# Patient Record
Sex: MALE | Race: WHITE | NOT HISPANIC OR LATINO | Employment: FULL TIME | URBAN - NONMETROPOLITAN AREA
[De-identification: names, ages, dates, MRNs, and addresses within clinical notes are randomized per-mention and may not be internally consistent; named-entity substitution may affect disease eponyms.]

---

## 2025-06-22 ENCOUNTER — OFFICE VISIT (OUTPATIENT)
Dept: URGENT CARE | Facility: URGENT CARE | Age: 65
End: 2025-06-22
Payer: COMMERCIAL

## 2025-06-22 ENCOUNTER — ANCILLARY PROCEDURE (OUTPATIENT)
Dept: URGENT CARE | Facility: URGENT CARE | Age: 65
End: 2025-06-22
Payer: COMMERCIAL

## 2025-06-22 VITALS
HEART RATE: 73 BPM | RESPIRATION RATE: 18 BRPM | TEMPERATURE: 98.4 F | WEIGHT: 206.35 LBS | DIASTOLIC BLOOD PRESSURE: 84 MMHG | BODY MASS INDEX: 30.56 KG/M2 | HEIGHT: 69 IN | OXYGEN SATURATION: 95 % | SYSTOLIC BLOOD PRESSURE: 158 MMHG

## 2025-06-22 DIAGNOSIS — M25.532 LEFT WRIST PAIN: ICD-10-CM

## 2025-06-22 DIAGNOSIS — M77.8 TENDONITIS OF WRIST, LEFT: Primary | ICD-10-CM

## 2025-06-22 DIAGNOSIS — S60.212A CONTUSION OF LEFT WRIST, INITIAL ENCOUNTER: ICD-10-CM

## 2025-06-22 PROCEDURE — 73110 X-RAY EXAM OF WRIST: CPT | Mod: LEFT SIDE | Performed by: NURSE PRACTITIONER

## 2025-06-22 PROCEDURE — 3008F BODY MASS INDEX DOCD: CPT | Performed by: NURSE PRACTITIONER

## 2025-06-22 PROCEDURE — 99203 OFFICE O/P NEW LOW 30 MIN: CPT | Performed by: NURSE PRACTITIONER

## 2025-06-22 PROCEDURE — 1159F MED LIST DOCD IN RCRD: CPT | Performed by: NURSE PRACTITIONER

## 2025-06-22 PROCEDURE — 1125F AMNT PAIN NOTED PAIN PRSNT: CPT | Performed by: NURSE PRACTITIONER

## 2025-06-22 PROCEDURE — 1036F TOBACCO NON-USER: CPT | Performed by: NURSE PRACTITIONER

## 2025-06-22 RX ORDER — LISINOPRIL 2.5 MG/1
2.5 TABLET ORAL DAILY
COMMUNITY

## 2025-06-22 RX ORDER — ATORVASTATIN CALCIUM 10 MG/1
10 TABLET, FILM COATED ORAL DAILY
COMMUNITY

## 2025-06-22 RX ORDER — METOPROLOL TARTRATE 100 MG/1
100 TABLET ORAL 2 TIMES DAILY
COMMUNITY

## 2025-06-22 RX ORDER — CIPROFLOXACIN HYDROCHLORIDE 3 MG/ML
SOLUTION/ DROPS OPHTHALMIC
COMMUNITY

## 2025-06-22 RX ORDER — METFORMIN HYDROCHLORIDE 1000 MG/1
1000 TABLET ORAL
COMMUNITY
Start: 2025-06-04

## 2025-06-22 RX ORDER — POTASSIUM CHLORIDE 750 MG/1
10 TABLET, EXTENDED RELEASE ORAL 2 TIMES DAILY
COMMUNITY
Start: 2025-06-04

## 2025-06-22 RX ORDER — AMLODIPINE BESYLATE 10 MG/1
10 TABLET ORAL DAILY
COMMUNITY

## 2025-06-22 RX ORDER — HYDROCHLOROTHIAZIDE 12.5 MG/1
12.5 CAPSULE ORAL DAILY
COMMUNITY

## 2025-06-22 RX ORDER — ACETAMINOPHEN 325 MG/1
650 TABLET ORAL ONCE
Status: COMPLETED | OUTPATIENT
Start: 2025-06-22 | End: 2025-06-22

## 2025-06-22 RX ADMIN — ACETAMINOPHEN 650 MG: 325 TABLET ORAL at 12:14

## 2025-06-22 ASSESSMENT — ENCOUNTER SYMPTOMS
VOMITING: 0
PAIN: 1
WEAKNESS: 0
CONFUSION: 0
ABDOMINAL PAIN: 0
FATIGUE: 0
SHORTNESS OF BREATH: 0
CHILLS: 0
ACTIVITY CHANGE: 0
WOUND: 0
DIAPHORESIS: 0
DIARRHEA: 0
FEVER: 0
DYSURIA: 0
LIGHT-HEADEDNESS: 0
APPETITE CHANGE: 0
DIZZINESS: 0
NAUSEA: 0

## 2025-06-22 ASSESSMENT — PAIN SCALES - GENERAL: PAINLEVEL_OUTOF10: 10-WORST PAIN EVER

## 2025-06-22 NOTE — PROGRESS NOTES
"Subjective   Patient ID: Rigo Brady is a 65 y.o. male. They present today with a chief complaint of Pain (No known injury, left wrist pain, left hand swelling, woke up Friday morning with pain/swelling).    History of Present Illness  Chief complaint: left wrist pain      HPI: Left ulnar wrist pain.  Onset 2 days prior to arrival.  Patient states 2 days ago he was changing a belt on his lawn more and accidentally hit his wrist on the lawn more frame.  Patient right-hand-dominant.  Mild swelling to the left palmar wrist area.  Please see pictorial.  No loss of sensation but mild decreased range of motion.  Patient states for 2 days he has been doing a lot of extra work changing Morgidox and working with 4 x 4's doing repetitive motions.  No open wounds.  No redness.  No pain \"out of the ordinary\".  Took Motrin prior to arrival.  Requesting Tylenol.      Nurses notes, vitals and old chart reviewed      History provided by:  Patient   used: No    Pain  Associated symptoms: no abdominal pain, no chest pain, no diarrhea, no fatigue, no fever, no nausea, no shortness of breath and no vomiting        Past Medical History  Allergies as of 06/22/2025 - Reviewed 06/22/2025   Allergen Reaction Noted   • Olmesartan Other and Unknown 04/23/2013   • Pineapple Cough 01/29/2025   • Valsartan Other and Unknown 04/23/2013       Prescriptions Prior to Admission[1]     Medical History[2]    Surgical History[3]     reports that he has quit smoking. His smoking use included cigarettes. He has never used smokeless tobacco. He reports current alcohol use.    Review of Systems  Review of Systems   Constitutional:  Negative for activity change, appetite change, chills, diaphoresis, fatigue and fever.   HENT: Negative.     Eyes:  Negative for visual disturbance.   Respiratory:  Negative for shortness of breath.    Cardiovascular:  Negative for chest pain.   Gastrointestinal:  Negative for abdominal pain, diarrhea, " "nausea and vomiting.   Genitourinary:  Negative for decreased urine volume, dysuria and urgency.   Musculoskeletal:         Left wrist pain   Skin:  Negative for wound.   Neurological:  Negative for dizziness, syncope, weakness and light-headedness.   Hematological:         NO BLOOD THINNNERS   Psychiatric/Behavioral:  Negative for confusion.                                   Objective    Vitals:    06/22/25 1129   BP: 158/84   Pulse: 73   Resp: 18   Temp: 36.9 °C (98.4 °F)   TempSrc: Oral   SpO2: 95%   Weight: 93.6 kg (206 lb 5.6 oz)   Height: 1.753 m (5' 9\")     No LMP for male patient.    Physical Exam  Musculoskeletal:        Hands:    Physical Exam  Vitals and nursing note reviewed.   Constitutional:       General: Patient is awake. NAD.      Appearance: Normal appearance. Patient is well-developed and well-groomed. Patient is not ill-appearing, toxic-appearing or diaphoretic.   HENT:      Head: Normocephalic and atraumatic.      Mouth/Throat:      Mouth: Mucous membranes are moist.      Pharynx: Oropharynx is clear.   Eyes:      General: No scleral icterus.  Cardiovascular:      Rate and Rhythm: Normal rate and regular rhythm.   Pulmonary:      Effort: Pulmonary effort is normal.      Breath sounds: Normal breath sounds.   Musculoskeletal:      RÍOS without deficits with the exception below:     Comments: Left palmar wrist pain and tenderness with mild swelling noted.  There is no erythema ecchymosis or open wounds.  Distal sensation in all 5 fingers is grossly intact.  Radial pulse ipsilaterally is 2+.  Ipsilateral elbow and shoulder joints unaffected.  Positive Finkelstein test at the radial aspect.  SKIN: No rashes/wounds  Neurological:      Mental Status: Patient is alert and oriented to person, place, and time.      GCS: GCS eye subscore is 4. GCS verbal subscore is 5. GCS motor subscore is 6.   Psychiatric:         Behavior: Behavior is cooperative.   Procedures    Point of Care Test & Imaging Results " from this visit  No results found for this visit on 06/22/25.   Imaging  No results found.    Cardiology, Vascular, and Other Imaging  No other imaging results found for the past 2 days      Diagnostic study results (if any) were reviewed by DAVON Solorio.    Assessment/Plan   Allergies, medications, history, and pertinent labs/EKGs/Imaging reviewed by DAVON Solorio.     Medical Decision Making  HPI: SEE NARRATIVE  HISTORIAN: Pateint  INDEPENDENT HISTORIAN:   RECORDS REVIEWED:  DIFFERENTIAL DX: Left wrist contusion versus de Quervain's tenosynovitis versus wrist fracture  LABS:  XRAY: X-ray as read by the radiologist:  FINDINGS:  There is widening of the scapholunate distance which may suggest  ligamentous injury and or early SLAC.      Bulging of the pronator quadratus fat pad worrisome for joint effusion      No acute fracture or dislocation. Joint spaces are otherwise normal.      IMPRESSION:  Findings as described    EKG:  IN CLINIC MEDICATIONS: Tylenol 650    Offered in clinic RX medications for patient they declined stated they wanted it sent through their insurance through their pharmacy.    CONSULTED WITH:  DIAGNOSIS: See urgent care course of treatment  SEE URGENT CARE COURSE OF TREATMENT AND DOCUMENTATION FOR RX MEDS GIVEN.   PROCEDURES: Patient refused wrist splint states he has 1 at home that he will put on.  DISCUSSIONS WITH PATIENT =    ITEMS OFFERED TO PATIENT: OFFERED IN HOUSE MEDICATIONS AND PATIENT DECLINED.     Patient and or family were counselled on labs, radiological studies, and all testing applicable for this visit as well as diagnosis. Patient was discharged homewith stable afebrile non-toxic vital signs. They verbalized discharge instructions that were given to them BOTH written and verbally by myself.  They were given ample time to ask questionsand have none at time of disposition.    Orders and Diagnoses  Diagnoses and all orders for this visit:  Left wrist pain  -      XR wrist left 3+ views; Future      Medical Admin Record      Patient disposition: Home    Electronically signed by DAVON Solorio  12:03 PM             [1]  (Not in a hospital admission)  [2]  No past medical history on file.  [3]  No past surgical history on file.

## 2025-06-22 NOTE — PATIENT INSTRUCTIONS
Ace wrap for comfort and support left wrist  Decreased activity left wrist  May alternate ice and moist heat   Tylenol or motrin for fevers  Read and follow preprinted instructions  As always you are invited to return if your condition should change or worsen in any way  If your condition worsens or becomes severe or life threatening, please go to the nearest emergency department  Follow up with your family dr in 3 days if no better.

## 2025-06-23 ENCOUNTER — TELEPHONE (OUTPATIENT)
Dept: URGENT CARE | Facility: URGENT CARE | Age: 65
End: 2025-06-23